# Patient Record
Sex: MALE | Race: WHITE | URBAN - METROPOLITAN AREA
[De-identification: names, ages, dates, MRNs, and addresses within clinical notes are randomized per-mention and may not be internally consistent; named-entity substitution may affect disease eponyms.]

---

## 2021-08-05 ENCOUNTER — NEW PATIENT COMPREHENSIVE (OUTPATIENT)
Dept: URBAN - METROPOLITAN AREA CLINIC 50 | Facility: CLINIC | Age: 51
End: 2021-08-05

## 2021-08-05 DIAGNOSIS — H43.812: ICD-10-CM

## 2021-08-05 DIAGNOSIS — E11.9: ICD-10-CM

## 2021-08-05 DIAGNOSIS — H25.13: ICD-10-CM

## 2021-08-05 PROCEDURE — 92004 COMPRE OPH EXAM NEW PT 1/>: CPT

## 2021-08-05 PROCEDURE — 92015 DETERMINE REFRACTIVE STATE: CPT

## 2021-08-05 PROCEDURE — 92134 CPTRZ OPH DX IMG PST SGM RTA: CPT

## 2021-08-05 ASSESSMENT — VISUAL ACUITY
OS_CC: 20/40-1
OD_CC: 20/40-1
OS_SC: 20/60-1
OD_SC: 20/40-1
OD_PH: 20/30-1
OU_CC: J1

## 2021-08-05 ASSESSMENT — KERATOMETRY
OD_AXISANGLE2_DEGREES: 101
OD_K2POWER_DIOPTERS: 40.75
OS_AXISANGLE_DEGREES: 066
OD_AXISANGLE_DEGREES: 11
OS_K1POWER_DIOPTERS: 35.50
OS_K2POWER_DIOPTERS: 38.75
OD_K1POWER_DIOPTERS: 37.75
OS_AXISANGLE2_DEGREES: 156

## 2021-08-05 ASSESSMENT — TONOMETRY
OD_IOP_MMHG: 17
OS_IOP_MMHG: 17

## 2022-08-03 ENCOUNTER — COMPREHENSIVE EXAM (OUTPATIENT)
Dept: URBAN - METROPOLITAN AREA CLINIC 52 | Facility: CLINIC | Age: 52
End: 2022-08-03

## 2022-08-03 DIAGNOSIS — E11.9: ICD-10-CM

## 2022-08-03 PROCEDURE — 92014 COMPRE OPH EXAM EST PT 1/>: CPT

## 2022-08-03 ASSESSMENT — VISUAL ACUITY
OS_PH: 20/50-1
OS_GLARE: 20/200
OU_CC: J1+
OU_CC: 20/20
OD_GLARE: 20/40
OD_GLARE: 20/80
OD_CC: 20/20
OS_CC: 20/80
OS_GLARE: 20/80

## 2022-08-03 ASSESSMENT — TONOMETRY
OD_IOP_MMHG: 08
OS_IOP_MMHG: 10

## 2022-08-03 NOTE — PATIENT DISCUSSION
Discussed the option of an RGP lens to help improve his vision. He is at peace with his vision and defers at this time. Discussed there is always the option of seeing a contact lens specialist such as Dr. Chris Vaughan.

## 2022-08-03 NOTE — PATIENT DISCUSSION
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 Salt intake or water intake can change the vision where RK was done.